# Patient Record
Sex: MALE | Race: OTHER | NOT HISPANIC OR LATINO | URBAN - METROPOLITAN AREA
[De-identification: names, ages, dates, MRNs, and addresses within clinical notes are randomized per-mention and may not be internally consistent; named-entity substitution may affect disease eponyms.]

---

## 2021-08-28 ENCOUNTER — TELEPHONE ENCOUNTER (OUTPATIENT)
Dept: URBAN - METROPOLITAN AREA CLINIC 13 | Facility: CLINIC | Age: 46
End: 2021-08-28

## 2021-08-29 ENCOUNTER — TELEPHONE ENCOUNTER (OUTPATIENT)
Dept: URBAN - METROPOLITAN AREA CLINIC 13 | Facility: CLINIC | Age: 46
End: 2021-08-29

## 2021-09-01 ENCOUNTER — OFFICE VISIT (OUTPATIENT)
Dept: URBAN - NONMETROPOLITAN AREA CLINIC 9 | Facility: CLINIC | Age: 46
End: 2021-09-01

## 2021-09-01 ENCOUNTER — DASHBOARD ENCOUNTERS (OUTPATIENT)
Age: 46
End: 2021-09-01

## 2021-09-01 ENCOUNTER — CLAIMS CREATED FROM THE CLAIM WINDOW (OUTPATIENT)
Dept: URBAN - METROPOLITAN AREA CLINIC 46 | Facility: CLINIC | Age: 46
End: 2021-09-01
Payer: SELF-PAY

## 2021-09-01 ENCOUNTER — LAB OUTSIDE AN ENCOUNTER (OUTPATIENT)
Dept: URBAN - METROPOLITAN AREA CLINIC 46 | Facility: CLINIC | Age: 46
End: 2021-09-01

## 2021-09-01 VITALS
HEART RATE: 74 BPM | BODY MASS INDEX: 31.83 KG/M2 | HEIGHT: 72 IN | DIASTOLIC BLOOD PRESSURE: 79 MMHG | TEMPERATURE: 98.3 F | SYSTOLIC BLOOD PRESSURE: 123 MMHG | WEIGHT: 235 LBS

## 2021-09-01 DIAGNOSIS — K64.4 SKIN TAGS, ANUS OR RECTUM: ICD-10-CM

## 2021-09-01 DIAGNOSIS — K62.5 RECTAL BLEEDING: ICD-10-CM

## 2021-09-01 PROBLEM — 12063002: Status: ACTIVE | Noted: 2021-09-01

## 2021-09-01 PROBLEM — 201091002: Status: ACTIVE | Noted: 2021-09-01

## 2021-09-01 PROCEDURE — 99203 OFFICE O/P NEW LOW 30 MIN: CPT | Performed by: INTERNAL MEDICINE

## 2021-09-01 RX ORDER — HYDROCORTISONE 25 MG/G
1 APPLICATION CREAM TOPICAL
Qty: 1 TUBE | Refills: 2 | OUTPATIENT
Start: 2021-09-01 | End: 2021-10-13

## 2021-09-01 RX ORDER — CIPROFLOXACIN HYDROCHLORIDE 500 MG/1
1 TABLET TABLET, FILM COATED ORAL ONCE A DAY
Qty: 10 TABLET | Refills: 1 | OUTPATIENT
Start: 2021-09-01 | End: 2021-09-21

## 2021-09-01 RX ORDER — THIAMINE HCL 100 MG
AS DIRECTED TABLET ORAL
Status: ACTIVE | COMMUNITY

## 2021-09-01 NOTE — HPI-ZZZ
45 year old male presents for evaluation of change of bowel habits and hemorrhoid flare. He has a history of formed stool but he had 13 years of loose stool. Now on high protein diet and has a loose stool once every 2-3 days. Denies blood in stool. Since diet changes, he has lost 40lbs in 5 months. The patient has had hemorrhoid flare for 20 years. He has extreme pain and has a hard time sititng.  For a few years he had bright red blood on tissue and in toilet but recently he is not bleeding as oftern. Hemorhoids cause pain but do not itch. In the last 6 months he feels they are very swollen and had hemorrhoid prolapse constantly since. Failed Prepartion-H. He is on seizure meds and is under good control. His cousin  from Crohn's disease at a young age.  He takes BCArthritis and is not on any NSAIDs.